# Patient Record
Sex: FEMALE | Race: WHITE
[De-identification: names, ages, dates, MRNs, and addresses within clinical notes are randomized per-mention and may not be internally consistent; named-entity substitution may affect disease eponyms.]

---

## 2017-01-18 ENCOUNTER — HOSPITAL ENCOUNTER (OUTPATIENT)
Dept: HOSPITAL 80 - CIMAGING | Age: 42
End: 2017-01-18
Attending: ADVANCED PRACTICE MIDWIFE
Payer: MEDICAID

## 2017-01-18 DIAGNOSIS — N83.291: Primary | ICD-10-CM

## 2017-01-18 DIAGNOSIS — Z97.5: ICD-10-CM

## 2017-01-18 NOTE — US
Ultrasound Pelvis Complete (Transabdominal and Endovaginal) Including Duplex/Doppler Imaging



History: N83.291, right ovarian cyst.



COMPARISON: September 2016.



Technique:  Transabdominal and endovaginal ultrasound images were obtained. Endovaginal images obtain
ed for better evaluation of the uterine myometrium and adnexa. Duplex/Doppler imaging of adnexa.



Findings:  Uterus measures 10 x 4 x 5 cm. Endometrial thickness is 7 mm. IUD appears in good position
 centrally within the endometrial canal. No definite uterine leiomyomata.



Right ovary measures 3.3 x 2.5 x 3.2 cm. Left ovary measures 3.4 x 2.9 x 1.8 cm. Left ovarian dominan
t follicle or simple cyst measuring 2.1 x 1.9 x 1.7 cm.  No significant free fluid in the pelvis. Col
or Doppler flow to both ovaries without torsion. 



Impression: 

1. IUD appears in good position.

2. Right ovarian benign simple cyst or dominant follicle measuring 2.1 x 1.9 x 1.7 cm, previously mario
earing complex and measuring 1.9 x 1.7 x 1.4 cm.

## 2017-02-08 ENCOUNTER — HOSPITAL ENCOUNTER (OUTPATIENT)
Dept: HOSPITAL 80 - CIMAGING | Age: 42
End: 2017-02-08
Attending: FAMILY MEDICINE
Payer: MEDICAID

## 2017-02-08 DIAGNOSIS — R10.11: Primary | ICD-10-CM

## 2017-02-08 NOTE — US
Sonography Limited to the Right Upper Quadrant of the Abdomen



CLINICAL HISTORY: 41-year-old female with chronic intermittent right upper quadrant pain for 4 years.
 The patient denies any nausea or vomiting. Evaluate for gallbladder disease. 



ICD 10 Diagnostic Code: R 10.11. 



TECHNIQUE: A curvilinear 5 MHz transducer was used to sonographically evaluate the right upper quadra
nt of the abdomen. Color Doppler was used.



COMPARISON STUDY: None.



FINDINGS: The pancreatic contour is normal. The abdominal aorta is normal in size, and tapers normall
y. The visualized IVC is normal in caliber. The hepatic vein trifurcation is normal. The main portal 
vein is patent. The liver is normal in size, measuring 11.7 cm along the right midaxillary line. Ther
e is no intra or extrahepatic bile duct dilatation. The common bile duct measures 1.7 mm. The gallbla
dder is moderately distended, and there is no evidence of cholelithiasis, sludge, polyp, wall thicken
ing, pericholecystic fluid, or sonographic Pisano sign. The gallbladder wall thickness is 1.7 mm, and
 a small junctional fold is seen. The right kidney is normal in size, shape, and contour, with a norm
al renal cortical thickness, and no focal renal mass or hydronephrosis, and measures 11.5 x 5.7 x 4.8
 cm. There is no ascites or right pleural effusion.



IMPRESSION: Normal study.

## 2017-09-26 ENCOUNTER — HOSPITAL ENCOUNTER (EMERGENCY)
Dept: HOSPITAL 80 - CED | Age: 42
Discharge: HOME | End: 2017-09-26
Payer: MEDICAID

## 2017-09-26 VITALS
OXYGEN SATURATION: 97 % | DIASTOLIC BLOOD PRESSURE: 63 MMHG | SYSTOLIC BLOOD PRESSURE: 112 MMHG | RESPIRATION RATE: 18 BRPM | HEART RATE: 91 BPM | TEMPERATURE: 98.2 F

## 2017-09-26 DIAGNOSIS — J20.9: Primary | ICD-10-CM

## 2017-09-26 NOTE — EDPHY
H & P


Time Seen by Provider: 09/26/17 10:12


HPI/ROS: 





This patient reports a cough for a month now.  She explains that the cough 

nearly resolved for handful of days until recurring 1 week ago and is now 

productive of times.  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 

teaching dance but otherwise no shortness of breath.  She had fevers and chills 

with the early days of the illness but has had no recent fevers.  She reports 

mild burning bronchial pain when she coughs but no pain with deep breaths or 

pleuritic pain.  She came in by private vehicle today for evaluation due to the 

ongoing symptoms.





ROS:  No high fevers or chills recently.  No fatigue.


HEENT:  No sinus pain.  No sore throat or change in her voice.


Pulmonary:  No respiratory distress.  Other symptoms as per HPI


Cardiovascular:  No heart palpitations, lightheadedness, leg swelling or leg 

pain


GI:  No nausea vomiting or abdominal pain


Integumentary:  No skin rash


7 point ROS is otherwise negative.


Past Medical/Surgical History: 





Bronchitis





Otherwise healthy


Smoking Status: Never smoked


Physical Exam: 





Physical Exam


Vital signs are normal.


General:  No acute distress


HEENT:  Nose:  Clear  bilaterally. No sinus tenderness to percussion.  Ears:  

External canals and tympanic membranes are clear with no erythema or abnormal 

findings bilaterally. Oropharynx:  No erythema or exudates. No dysphonia.  No 

drooling or stridor.  


Eyes:  Pupils equal and react to light.  Extraocular motions are intact.


Neck:  Supple with no meningismus.  No lymphadenopathy


Lungs:  Mild expiratory wheeze bilaterally.  No rales .  Mild rhonchi.


Cardiac:  Regular rate and rhythm with no murmur gallop or rub


Skin:  No rash or pallor.


Neuro:  Alert with no focal deficits noted.


Constitutional: 


 Initial Vital Signs











Temperature (C)  36.8 C   09/26/17 09:53


 


Heart Rate  91   09/26/17 09:53


 


Respiratory Rate  18   09/26/17 09:53


 


Blood Pressure  112/63   09/26/17 09:53


 


O2 Sat (%)  97   09/26/17 09:53








 











O2 Delivery Mode               Room Air














Allergies/Adverse Reactions: 


 





No Known Allergies Allergy (Verified 09/26/17 09:55)


 








Home Medications: 














 Medication  Instructions  Recorded


 


Albuterol Hfa Anes Only [Proair 2 puffs IH Q4 PRN #1 mdi 09/26/17





Hfa Icu (*)]  


 


Azithromycin [Zithromax] 250 mg PO DAILY #6 tab 09/26/17














MDM/Departure





- [facility]


ED Course/Re-evaluation: 





Discussion:  Clinical findings consistent with bronchitis.  Given duration of 

symptoms, will cover atypical bacterial pathogens with Zithromax.  I counseled 

the patient regarding this.  Will also give her an albuterol inhaler.  No 

clinical findings to suggest lower respiratory infection, pulmonary embolism or 

other complicating factors.  However, patient her stands in each return 

emergency department should she develop any worsening of her symptoms despite 

the treatment plan.





- Depart


Disposition: Home, Routine, Self-Care


Clinical Impression: 


Acute bronchitis


Qualifiers:


 Bronchitis organism: unspecified organism Qualified Code(s): J20.9 - Acute 

bronchitis, unspecified





Condition: Good


Instructions:  Acute Bronchitis (ED)


Additional Instructions: 


Diagnosis:  Acute bronchitis





Plan:  Humidifier





Albuterol inhaler for cough, wheeze or shortness of breath





Zithromax antibiotic





Return for any significant worsening despite the treatment plan





Follow up primary care physician for any ongoing symptoms despite the treatment 

plan.


Prescriptions: 


Albuterol Hfa Anes Only [Proair Hfa Icu (*)] 2 puffs IH Q4 PRN #1 mdi


 PRN Reason: Wheezing


Azithromycin [Zithromax] 250 mg PO DAILY #6 tab


Referrals: 


VISHNU SMITH,Damian [Primary Care Provider] - As per Instructions

## 2018-01-18 ENCOUNTER — HOSPITAL ENCOUNTER (OUTPATIENT)
Dept: HOSPITAL 80 - CIMAGING | Age: 43
End: 2018-01-18
Attending: ADVANCED PRACTICE MIDWIFE
Payer: MEDICAID

## 2018-01-18 DIAGNOSIS — N83.202: Primary | ICD-10-CM

## 2018-01-18 DIAGNOSIS — Z97.5: ICD-10-CM

## 2018-10-30 ENCOUNTER — HOSPITAL ENCOUNTER (OUTPATIENT)
Dept: HOSPITAL 80 - CIMAGING | Age: 43
End: 2018-10-30
Attending: FAMILY MEDICINE
Payer: MEDICAID

## 2018-10-30 DIAGNOSIS — D73.4: Primary | ICD-10-CM

## 2019-02-05 ENCOUNTER — HOSPITAL ENCOUNTER (OUTPATIENT)
Dept: HOSPITAL 80 - CIMAGING | Age: 44
End: 2019-02-05
Attending: ADVANCED PRACTICE MIDWIFE
Payer: MEDICAID

## 2019-02-05 DIAGNOSIS — Z12.31: Primary | ICD-10-CM

## 2019-02-05 DIAGNOSIS — T83.32XA: ICD-10-CM
